# Patient Record
Sex: FEMALE | Race: WHITE | NOT HISPANIC OR LATINO | URBAN - METROPOLITAN AREA
[De-identification: names, ages, dates, MRNs, and addresses within clinical notes are randomized per-mention and may not be internally consistent; named-entity substitution may affect disease eponyms.]

---

## 2017-11-26 ENCOUNTER — EMERGENCY (EMERGENCY)
Facility: HOSPITAL | Age: 52
LOS: 1 days | Discharge: HOME | End: 2017-11-26
Attending: EMERGENCY MEDICINE
Payer: COMMERCIAL

## 2017-11-26 VITALS
DIASTOLIC BLOOD PRESSURE: 95 MMHG | HEART RATE: 75 BPM | RESPIRATION RATE: 20 BRPM | TEMPERATURE: 98 F | WEIGHT: 175.05 LBS | HEIGHT: 65 IN | SYSTOLIC BLOOD PRESSURE: 173 MMHG | OXYGEN SATURATION: 100 %

## 2017-11-26 PROCEDURE — 12011 RPR F/E/E/N/L/M 2.5 CM/<: CPT

## 2017-11-26 PROCEDURE — 99283 EMERGENCY DEPT VISIT LOW MDM: CPT | Mod: 25

## 2017-11-26 PROCEDURE — 70160 X-RAY EXAM OF NASAL BONES: CPT

## 2017-11-26 PROCEDURE — 70160 X-RAY EXAM OF NASAL BONES: CPT | Mod: 26

## 2017-11-26 RX ADMIN — Medication 1 TABLET(S): at 21:10

## 2017-11-26 NOTE — ED ADULT NURSE NOTE - OBJECTIVE STATEMENT
pt s.p trip and fall from standing height. AOX3, MCELROY, abrasions noted to lip and eyebrows. pt with no loc, no use of thinners. even and unlabored resps present. skin warm and dry.
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
## 2017-11-26 NOTE — ED STATDOCS - CARE PLAN
Principal Discharge DX:	Fall, initial encounter  Instructions for follow-up, activity and diet:	Continue with Medication as discussed  Secondary Diagnosis:	Lip laceration, initial encounter
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
## 2017-11-26 NOTE — ED STATDOCS - PROGRESS NOTE DETAILS
Pt refused pain medication and sent for X-ray. Re-evaluate post Imaging. X-ray negative for Fracture. Pt treated with Augmentin in ED and D/C in stable condition . F/U with Dentist as discussed.

## 2017-11-26 NOTE — ED STATDOCS - ATTENDING CONTRIBUTION TO CARE
Patient seen with acp  fell on her face no loc has a small laceration to upper lip closed with one chromic has an abrasion and swelling to nose  nasal x-rays are negative   Agree with PAs assessment, hx and physcial

## 2017-11-26 NOTE — ED STATDOCS - OBJECTIVE STATEMENT
52 yr old F presented to ED with  for laceration to lip and nasal swelling s/p fall x 3 hs ago. Pt states that she tripped and lost her balance and fell hitting her face. Pt denies any LOC , dizziness or headache. Pt sates that she was bleeding from her lip. Pt denies any tooth pain. Pt denies any significant past medical or surgical history. Pt also denies any current medication use. Pt cannot her last TD but refused Td when offered.

## 2017-11-26 NOTE — ED STATDOCS - PHYSICAL EXAMINATION
Laceration to upper lip . possible for a tooth with fall. No acute bleeding at this time.   Abrasion to face and Nasal swelling . Laceration to upper lip . possible for a tooth with fall. No acute bleeding at this time.   Abrasion to face and Nasal swelling .  Black area top front tooth, Cracked front 3 tooth, No chip noted.